# Patient Record
Sex: FEMALE | Race: WHITE | Employment: OTHER | URBAN - METROPOLITAN AREA
[De-identification: names, ages, dates, MRNs, and addresses within clinical notes are randomized per-mention and may not be internally consistent; named-entity substitution may affect disease eponyms.]

---

## 2017-05-10 PROBLEM — R09.82 PND (POST-NASAL DRIP): Status: ACTIVE | Noted: 2017-05-10

## 2017-05-10 PROBLEM — R05.9 COUGH: Status: ACTIVE | Noted: 2017-05-10

## 2017-08-24 ENCOUNTER — HOSPITAL ENCOUNTER (OUTPATIENT)
Dept: GENERAL RADIOLOGY | Age: 67
Discharge: HOME | End: 2017-08-24
Payer: MEDICARE

## 2017-08-24 DIAGNOSIS — R05.9 COUGH: ICD-10-CM

## 2017-08-24 PROCEDURE — 71020 XR CHEST PA LAT: CPT

## 2017-10-05 ENCOUNTER — HOSPITAL ENCOUNTER (OUTPATIENT)
Dept: LAB | Age: 67
Discharge: HOME | End: 2017-10-05

## 2017-10-05 PROCEDURE — 88305 TISSUE EXAM BY PATHOLOGIST: CPT | Performed by: INTERNAL MEDICINE

## 2017-11-16 ENCOUNTER — HOSPITAL ENCOUNTER (OUTPATIENT)
Dept: MAMMOGRAPHY | Age: 67
Discharge: HOME | End: 2017-11-16
Attending: INTERNAL MEDICINE
Payer: MEDICARE

## 2017-11-16 DIAGNOSIS — Z12.31 VISIT FOR SCREENING MAMMOGRAM: ICD-10-CM

## 2017-11-16 PROCEDURE — 77063 BREAST TOMOSYNTHESIS BI: CPT

## 2018-12-12 ENCOUNTER — HOSPITAL ENCOUNTER (OUTPATIENT)
Dept: MAMMOGRAPHY | Age: 68
Discharge: HOME | End: 2018-12-12
Attending: INTERNAL MEDICINE
Payer: MEDICARE

## 2018-12-12 DIAGNOSIS — Z12.31 VISIT FOR SCREENING MAMMOGRAM: ICD-10-CM

## 2018-12-12 PROCEDURE — 77063 BREAST TOMOSYNTHESIS BI: CPT

## 2020-05-20 ENCOUNTER — HOSPITAL ENCOUNTER (OUTPATIENT)
Dept: MAMMOGRAPHY | Age: 70
Discharge: HOME | End: 2020-05-20
Attending: INTERNAL MEDICINE
Payer: MEDICARE

## 2020-05-20 DIAGNOSIS — Z12.31 SCREENING MAMMOGRAM FOR HIGH-RISK PATIENT: ICD-10-CM

## 2020-05-20 PROCEDURE — 77063 BREAST TOMOSYNTHESIS BI: CPT

## 2021-04-28 ENCOUNTER — TRANSCRIBE ORDER (OUTPATIENT)
Dept: SCHEDULING | Age: 71
End: 2021-04-28

## 2021-04-28 DIAGNOSIS — Z12.31 SCREENING MAMMOGRAM FOR HIGH-RISK PATIENT: Primary | ICD-10-CM

## 2021-05-25 ENCOUNTER — HOSPITAL ENCOUNTER (OUTPATIENT)
Dept: MAMMOGRAPHY | Age: 71
Discharge: HOME | End: 2021-05-25
Attending: INTERNAL MEDICINE
Payer: MEDICARE

## 2021-05-25 DIAGNOSIS — Z12.31 SCREENING MAMMOGRAM FOR HIGH-RISK PATIENT: ICD-10-CM

## 2021-05-25 PROCEDURE — 77063 BREAST TOMOSYNTHESIS BI: CPT

## 2022-03-19 PROBLEM — R09.82 PND (POST-NASAL DRIP): Status: ACTIVE | Noted: 2017-05-10

## 2022-03-19 PROBLEM — R05.9 COUGH: Status: ACTIVE | Noted: 2017-05-10

## 2022-04-29 ENCOUNTER — TRANSCRIBE ORDER (OUTPATIENT)
Dept: SCHEDULING | Age: 72
End: 2022-04-29

## 2022-04-29 DIAGNOSIS — Z12.31 VISIT FOR SCREENING MAMMOGRAM: Primary | ICD-10-CM

## 2022-05-27 ENCOUNTER — HOSPITAL ENCOUNTER (OUTPATIENT)
Dept: MAMMOGRAPHY | Age: 72
Discharge: HOME | End: 2022-05-30
Payer: MEDICARE

## 2022-05-27 DIAGNOSIS — Z12.31 SCREENING MAMMOGRAM, ENCOUNTER FOR: ICD-10-CM

## 2022-05-27 PROCEDURE — 77063 BREAST TOMOSYNTHESIS BI: CPT

## 2023-03-31 ENCOUNTER — HOSPITAL ENCOUNTER (OUTPATIENT)
Dept: MRI IMAGING | Age: 73
End: 2023-03-31
Payer: MEDICARE

## 2023-03-31 DIAGNOSIS — R90.89 ABNORMAL BRAIN MRI: ICD-10-CM

## 2023-03-31 PROCEDURE — 2580000003 HC RX 258: Performed by: INTERNAL MEDICINE

## 2023-03-31 PROCEDURE — A9579 GAD-BASE MR CONTRAST NOS,1ML: HCPCS | Performed by: INTERNAL MEDICINE

## 2023-03-31 PROCEDURE — 6360000004 HC RX CONTRAST MEDICATION: Performed by: INTERNAL MEDICINE

## 2023-03-31 PROCEDURE — 70553 MRI BRAIN STEM W/O & W/DYE: CPT

## 2023-03-31 RX ORDER — SODIUM CHLORIDE 0.9 % (FLUSH) 0.9 %
10 SYRINGE (ML) INJECTION AS NEEDED
Status: DISCONTINUED | OUTPATIENT
Start: 2023-03-31 | End: 2023-04-04 | Stop reason: HOSPADM

## 2023-03-31 RX ADMIN — GADOTERIDOL 17 ML: 279.3 INJECTION, SOLUTION INTRAVENOUS at 07:29

## 2023-03-31 RX ADMIN — SODIUM CHLORIDE, PRESERVATIVE FREE 10 ML: 5 INJECTION INTRAVENOUS at 07:29

## 2023-04-28 ENCOUNTER — TRANSCRIBE ORDERS (OUTPATIENT)
Dept: SCHEDULING | Age: 73
End: 2023-04-28

## 2023-04-28 DIAGNOSIS — Z12.31 VISIT FOR SCREENING MAMMOGRAM: Primary | ICD-10-CM

## 2023-05-30 ENCOUNTER — HOSPITAL ENCOUNTER (OUTPATIENT)
Dept: MAMMOGRAPHY | Age: 73
Discharge: HOME | End: 2023-06-02
Payer: MEDICARE

## 2023-05-30 DIAGNOSIS — Z12.31 VISIT FOR SCREENING MAMMOGRAM: ICD-10-CM

## 2023-05-30 PROCEDURE — 77063 BREAST TOMOSYNTHESIS BI: CPT

## 2023-07-03 ENCOUNTER — OFFICE VISIT (OUTPATIENT)
Dept: SLEEP MEDICINE | Age: 73
End: 2023-07-03
Payer: MEDICARE

## 2023-07-03 VITALS
WEIGHT: 180 LBS | SYSTOLIC BLOOD PRESSURE: 138 MMHG | TEMPERATURE: 96.6 F | HEIGHT: 60 IN | BODY MASS INDEX: 35.34 KG/M2 | RESPIRATION RATE: 16 BRPM | DIASTOLIC BLOOD PRESSURE: 80 MMHG | HEART RATE: 71 BPM | OXYGEN SATURATION: 97 %

## 2023-07-03 DIAGNOSIS — G25.81 RLS (RESTLESS LEGS SYNDROME): ICD-10-CM

## 2023-07-03 DIAGNOSIS — G47.33 OSA (OBSTRUCTIVE SLEEP APNEA): Primary | ICD-10-CM

## 2023-07-03 DIAGNOSIS — G47.00 ORGANIC INSOMNIA: ICD-10-CM

## 2023-07-03 DIAGNOSIS — G47.61 PLMD (PERIODIC LIMB MOVEMENT DISORDER): ICD-10-CM

## 2023-07-03 DIAGNOSIS — R53.83 OTHER FATIGUE: ICD-10-CM

## 2023-07-03 PROCEDURE — G8427 DOCREV CUR MEDS BY ELIG CLIN: HCPCS | Performed by: INTERNAL MEDICINE

## 2023-07-03 PROCEDURE — G8400 PT W/DXA NO RESULTS DOC: HCPCS | Performed by: INTERNAL MEDICINE

## 2023-07-03 PROCEDURE — 1036F TOBACCO NON-USER: CPT | Performed by: INTERNAL MEDICINE

## 2023-07-03 PROCEDURE — 3017F COLORECTAL CA SCREEN DOC REV: CPT | Performed by: INTERNAL MEDICINE

## 2023-07-03 PROCEDURE — 1090F PRES/ABSN URINE INCON ASSESS: CPT | Performed by: INTERNAL MEDICINE

## 2023-07-03 PROCEDURE — 99204 OFFICE O/P NEW MOD 45 MIN: CPT | Performed by: INTERNAL MEDICINE

## 2023-07-03 PROCEDURE — G8417 CALC BMI ABV UP PARAM F/U: HCPCS | Performed by: INTERNAL MEDICINE

## 2023-07-03 PROCEDURE — 1123F ACP DISCUSS/DSCN MKR DOCD: CPT | Performed by: INTERNAL MEDICINE

## 2023-07-03 RX ORDER — AMOXICILLIN 250 MG
CAPSULE ORAL
COMMUNITY

## 2023-07-03 RX ORDER — AMLODIPINE BESYLATE 2.5 MG/1
TABLET ORAL
COMMUNITY
Start: 2023-06-30

## 2023-07-03 RX ORDER — COVID-19 ANTIGEN TEST
KIT MISCELLANEOUS
COMMUNITY

## 2023-07-03 RX ORDER — LOSARTAN POTASSIUM 100 MG/1
TABLET ORAL
COMMUNITY
Start: 2023-05-24

## 2023-07-03 RX ORDER — FAMOTIDINE 20 MG/1
20 TABLET, FILM COATED ORAL NIGHTLY
COMMUNITY
Start: 2023-06-01

## 2023-07-03 NOTE — PATIENT INSTRUCTIONS
It was a pleasure meeting you today. Here are some items that we discussed:    1. We will try a full face mask to treat your severe obstructive sleep apnea. We will ask [facility] to do a mask fitting. [facility]    Address: [address] [place] [address], [place], [address]  Phone: [phone]  Fax: [phone]    2. We ordered the following labs today: iron, vit d, ferritin, crp, magneisum, bmp  Please call us if you have any trouble getting them done or if you have not heard from us a week after the lab draw. 3.  If insomnia is of concern, keep a sleep log for my review and bring to followup appointment. Have at least 4 weeks worth of data. No Sanchez MD  [phone]     Sleep Apnea: Care Instructions  Overview     Sleep apnea means that you frequently stop breathing for 10 seconds or longer during sleep. It can be mild to severe, based on the number of times an hour that you stop breathing. Blocked or narrowed airways in your nose, mouth, or throat can cause sleep apnea. Your airway can become blocked when your throat muscles and tongue relax during sleep. You can help treat sleep apnea at home by making lifestyle changes. You also can use a CPAP breathing machine that keeps tissues in the throat from blocking your airway. Or your doctor may suggest that you use a breathing device while you sleep. It helps keep your airway open. This could be a device that you put in your mouth. In some cases, surgery may be needed to remove enlarged tissues in the throat. Follow-up care is a key part of your treatment and safety. Be sure to make and go to all appointments, and call your doctor if you are having problems. It's also a good idea to know your test results and keep a list of the medicines you take. How can you care for yourself at home? Lose weight, if needed. Sleep on your side. It may help mild apnea. Avoid alcohol and medicines such as sleeping pills, opioids, or sedatives before bed.   Don't

## 2023-07-03 NOTE — ASSESSMENT & PLAN NOTE
Patient has been maintained on trazodone for years, recently increased to 150 mg. She would like to be off of sleep medications eventually, encouraged her to complete a sleep log for our review at follow-up starting 4 weeks prior to the visit. I am hesitant to make any changes today while we are getting her really accustomed to CPAP.

## 2023-07-03 NOTE — ASSESSMENT & PLAN NOTE
Patient has mild (severe in supine sleep) sleep apnea and had done well with CPAP but has fallen out of the habit, she has never tried a fullface mask and would like to try this which we will order today through [facility], she thinks she would like an oronasal mask the best.  Her settings appear to be correct and I showed her how to operate her machine to adjust need to be and read her reports. We will see her back in 3 to 4 months, sooner if needed can occasionally update on HAILEY and CPAP.

## 2023-07-03 NOTE — ASSESSMENT & PLAN NOTE
Patient has clinical symptoms of restless leg syndrome, and this is very rarely affecting her  ability to sleep. We will obtain blood work today for review at next visit, iron panel, ferritin, vitamin D, magnesium, renal function (if not done recently). Restless leg syndrome symptomatology explained as well as educational handout given.

## 2023-08-28 ENCOUNTER — TELEPHONE (OUTPATIENT)
Dept: SLEEP MEDICINE | Age: 73
End: 2023-08-28

## 2023-08-28 NOTE — TELEPHONE ENCOUNTER
Patient says she has not heard anything from [facility] about the cpap order are setting up the mask fitting.  Ask that someone please check on this

## 2023-08-29 NOTE — TELEPHONE ENCOUNTER
Spoke with [employer]. Pt refused set up on machine she just wanted supplies. Bety Alfred said he will reach out to the patient to see what she wants to do.   Contact pt and lvm letting her know RM will be reaching out to her

## 2023-11-10 NOTE — PROGRESS NOTES
[address] [place]. [address] [facility], [address] [place] [address]  [phone]    PATIENT ID    Ms. Solomon Pal  1950  Her primary provider is Yesenia Steen PA-C    CC: Ms. Nadeem Carranza returns to the clinic today for follow up of her obstructive sleep apnea. INTERVAL HISTORY  Ms. Nadeem Carranza was originally diagnosed with mild obstructive sleep apnea in 2010 with an AHI of 9, severe by RDI criteria with an RDI of greater than 30, severe PLMI noted as well at that time . The patient has a medical history significant for BMI 35, hypertension/hyperlipidemia, type 2 diabetes mellitus, gastroesophageal reflux, anxiety/depression, arthritis, allergic rhinitis with deviated septum, history of TIA, chronic insomnia, restless leg syndrome. At last visit a new machine was ordered but she did not want to get a new machine and they called her because she thought it would be back in clinic he does like her old one but now realizes that the newer machines are smaller and would like them to go ahead and provide it to her. She did get a new mask the AirFit P30 I small and brings it today for fitting but unfortunately did not bring her machine or tubing. She notes she has started to wear this and is hearing a small whooshing sound and wondering if this is normal.  She denies any intolerance of pressure either too low or too high or any skin irritation. No interval change in health or medications otherwise. She continues on trazodone extended release 150 mg nightly, takes her Ativan mostly in the morning. Her insomnia is stable. We did not discuss restless legs today. She was last seen by me in 7/23 and was struggling with CPAP therapy, new machine ordered back then. Over the past week, since implementing CPAP therapy Ms. Nadeem Carranza feels more rested and less fatigued.   On CPAP download review today she is utilizing her CPAP machine 7% % of the time, greater than 4 hours per night, for a

## 2023-11-13 ENCOUNTER — TELEPHONE (OUTPATIENT)
Dept: SLEEP MEDICINE | Age: 73
End: 2023-11-13

## 2023-11-13 ENCOUNTER — OFFICE VISIT (OUTPATIENT)
Dept: SLEEP MEDICINE | Age: 73
End: 2023-11-13
Payer: MEDICARE

## 2023-11-13 VITALS
HEART RATE: 63 BPM | TEMPERATURE: 97.9 F | BODY MASS INDEX: 36.24 KG/M2 | SYSTOLIC BLOOD PRESSURE: 140 MMHG | WEIGHT: 184.6 LBS | RESPIRATION RATE: 16 BRPM | OXYGEN SATURATION: 95 % | DIASTOLIC BLOOD PRESSURE: 64 MMHG | HEIGHT: 60 IN

## 2023-11-13 DIAGNOSIS — G47.33 OSA (OBSTRUCTIVE SLEEP APNEA): Primary | ICD-10-CM

## 2023-11-13 DIAGNOSIS — G25.81 RLS (RESTLESS LEGS SYNDROME): ICD-10-CM

## 2023-11-13 DIAGNOSIS — G47.00 ORGANIC INSOMNIA: ICD-10-CM

## 2023-11-13 PROCEDURE — G8427 DOCREV CUR MEDS BY ELIG CLIN: HCPCS | Performed by: INTERNAL MEDICINE

## 2023-11-13 PROCEDURE — 1123F ACP DISCUSS/DSCN MKR DOCD: CPT | Performed by: INTERNAL MEDICINE

## 2023-11-13 PROCEDURE — G8417 CALC BMI ABV UP PARAM F/U: HCPCS | Performed by: INTERNAL MEDICINE

## 2023-11-13 PROCEDURE — 1090F PRES/ABSN URINE INCON ASSESS: CPT | Performed by: INTERNAL MEDICINE

## 2023-11-13 PROCEDURE — 1036F TOBACCO NON-USER: CPT | Performed by: INTERNAL MEDICINE

## 2023-11-13 PROCEDURE — G8400 PT W/DXA NO RESULTS DOC: HCPCS | Performed by: INTERNAL MEDICINE

## 2023-11-13 PROCEDURE — 3017F COLORECTAL CA SCREEN DOC REV: CPT | Performed by: INTERNAL MEDICINE

## 2023-11-13 PROCEDURE — 99214 OFFICE O/P EST MOD 30 MIN: CPT | Performed by: INTERNAL MEDICINE

## 2023-11-13 PROCEDURE — G8484 FLU IMMUNIZE NO ADMIN: HCPCS | Performed by: INTERNAL MEDICINE

## 2023-11-13 ASSESSMENT — SLEEP AND FATIGUE QUESTIONNAIRES
ESS TOTAL SCORE: 8
HOW LIKELY ARE YOU TO NOD OFF OR FALL ASLEEP WHILE LYING DOWN TO REST IN THE AFTERNOON WHEN CIRCUMSTANCES PERMIT: 3
HOW LIKELY ARE YOU TO NOD OFF OR FALL ASLEEP WHILE WATCHING TV: 1
HOW LIKELY ARE YOU TO NOD OFF OR FALL ASLEEP WHILE SITTING INACTIVE IN A PUBLIC PLACE: 0
HOW LIKELY ARE YOU TO NOD OFF OR FALL ASLEEP WHEN YOU ARE A PASSENGER IN A CAR FOR AN HOUR WITHOUT A BREAK: 2
HOW LIKELY ARE YOU TO NOD OFF OR FALL ASLEEP WHILE SITTING QUIETLY AFTER LUNCH WITHOUT ALCOHOL: 1
HOW LIKELY ARE YOU TO NOD OFF OR FALL ASLEEP WHILE SITTING AND TALKING TO SOMEONE: 0
HOW LIKELY ARE YOU TO NOD OFF OR FALL ASLEEP IN A CAR, WHILE STOPPED FOR A FEW MINUTES IN TRAFFIC: 0
HOW LIKELY ARE YOU TO NOD OFF OR FALL ASLEEP WHILE SITTING AND READING: 1

## 2023-11-13 NOTE — TELEPHONE ENCOUNTER
Pt will need new sleep study in order to get a new cpap machine. After sleep study is read, her sleep study will need to go to  and order for a new machine.  Spoke with pt and she is agreed to hst.

## 2023-11-13 NOTE — ASSESSMENT & PLAN NOTE
The AirFit P30 I small, did fitting in clinic today and this does not appear to be a good fit unfortunately she did not bring her machine so I could not apply pressure. He initially had mild obstructive sleep apnea in 2010 I suspect progression of her disease we discussed reevaluating her at some point but she would like to try her new mask and go ahead and proceed with getting a new machine as ordered last visit. Supply order sent to [facility] we will see her back in 3 months for face-to-face compliance visit.

## 2023-11-13 NOTE — ASSESSMENT & PLAN NOTE
This is stable on trazodone, we agreed that someday we should try to pull back but I like her to be established on CPAP therapy before we do this. Patient is agreeable.   She certainly

## 2023-11-13 NOTE — PATIENT INSTRUCTIONS
vinegar)     Some companies may tell you to add a little bit of bleach. However I find that bleach to be too toxic and difficult to rinse adequately. Supply Replacement Schedule (what insurance will cover)    You may not need to replace all parts this frequently if you are doing proper cleaning. Filters: 2 per month  Nasal Cushion/Pillow: 2 per month  Full Face cushion: 1 per month  Tubin every 3 months  Full Mask: 1 every 6 months  Headgear: 1 every 6 months  Water Chamber: 1 every 6 months  Chinstrap: 1 every 6 months      Many patients struggle to find comfortable heat and humidity settings on their device. If you are having issues with condensation in your mask or tubing, it usually means your temperature is too low, and/or your humidity is too high. If you are experiencing nasal congestion, it usually means you would benefit from higher humidity setting. Most people on nasal pillows are more comfortable if the heat and humidity are relatively high, such as 84-86 degrees, and 5-6 humidity. You can adjust these settings yourself to find what is comfortable for you. You can always call us or your equipment company for help, as well. Contact your equipment vendor for replacement supplies whenever in need.

## 2023-12-06 ENCOUNTER — HOSPITAL ENCOUNTER (OUTPATIENT)
Dept: SLEEP CENTER | Age: 73
Discharge: HOME | End: 2023-12-09

## 2023-12-16 ENCOUNTER — HOSPITAL ENCOUNTER (OUTPATIENT)
Dept: MRI IMAGING | Age: 73
End: 2023-12-16
Payer: MEDICARE

## 2023-12-16 DIAGNOSIS — R90.89 ABNORMAL BRAIN MRI: ICD-10-CM

## 2023-12-16 PROCEDURE — 6360000004 HC RX CONTRAST MEDICATION: Performed by: PHYSICIAN ASSISTANT

## 2023-12-16 PROCEDURE — 70553 MRI BRAIN STEM W/O & W/DYE: CPT

## 2023-12-16 PROCEDURE — A9579 GAD-BASE MR CONTRAST NOS,1ML: HCPCS | Performed by: PHYSICIAN ASSISTANT

## 2023-12-16 PROCEDURE — 2580000003 HC RX 258: Performed by: PHYSICIAN ASSISTANT

## 2023-12-16 RX ORDER — SODIUM CHLORIDE 0.9 % (FLUSH) 0.9 %
10 SYRINGE (ML) INJECTION AS NEEDED
Status: ACTIVE | OUTPATIENT
Start: 2023-12-16

## 2023-12-16 RX ADMIN — SODIUM CHLORIDE, PRESERVATIVE FREE 10 ML: 5 INJECTION INTRAVENOUS at 09:18

## 2023-12-16 RX ADMIN — GADOTERIDOL 17 ML: 279.3 INJECTION, SOLUTION INTRAVENOUS at 09:18

## 2024-05-02 NOTE — PROGRESS NOTES
Clavulanate      Other reaction(s): Unknown    Penicillins Rash    Sulfa Antibiotics Rash       OBJECTIVE   /83   Pulse 83   Temp 98.1 °F (36.7 °C)   Resp 16   Ht 1.524 m (5')   Wt 82.6 kg (182 lb 3.2 oz)   SpO2 95%   BMI 35.58 kg/m²         Exam:  Constitutional: Pleasant 73 y.o. female, appears well-developed and well-nourished. Not in acute distress.     Eyes: Conjunctivae and lids appear normal. No pallor or icterus   Skin: Not diaphoretic. No lesions  HENT: Head normocephalic. Nares appear patent   Pulmonary: Respiratory effort is normal without labored breathing or accessory muscle use. No respiratory distress/cough/wheezng  Neurological: alert , Attention, speech and language are normal. There is facial symmetry.   Musculoskeletal:  Moves all extremities equally and no focal weakness noted. Gait is unassisted.  Psychiatric: pleasant, mood and affect well adjusted.   cooperative and behavior is appropriate.    Results:                         ASSESSMENT AND PLAN  1. Organic insomnia  Assessment & Plan:   Stable on trazodone, she notes when she does not wear CPAP she wakes up in the middle the night more frequently and I take explained to her this is expected when she is not breathing well, when she wears her CPAP she is able to sleep through the entire night  2. HAILEY (obstructive sleep apnea)  Assessment & Plan:  Patient is still struggling with using CPAP, it seems to be mostly like a habit.  She is concerned about taking it on travel trips.  She has underlying mild sleep apnea and may be a good candidate for oral appliance, as her last HST failed we will reorder this today and send her to Dr. Thomas's office at [facility].  For now we agreed to continue CPAP and we showed her how to take apart her mask to clean it and as she is having some nasal soreness with nasal pillows we will try nasal cushions, we will plan to tentatively follow-up in 4 months.  We will hold off on

## 2024-05-03 ENCOUNTER — OFFICE VISIT (OUTPATIENT)
Dept: SLEEP MEDICINE | Age: 74
End: 2024-05-03
Payer: MEDICARE

## 2024-05-03 ENCOUNTER — CLINICAL DOCUMENTATION (OUTPATIENT)
Dept: SLEEP MEDICINE | Age: 74
End: 2024-05-03

## 2024-05-03 VITALS
SYSTOLIC BLOOD PRESSURE: 132 MMHG | BODY MASS INDEX: 35.77 KG/M2 | RESPIRATION RATE: 16 BRPM | HEART RATE: 83 BPM | OXYGEN SATURATION: 95 % | TEMPERATURE: 98.1 F | WEIGHT: 182.2 LBS | DIASTOLIC BLOOD PRESSURE: 83 MMHG | HEIGHT: 60 IN

## 2024-05-03 DIAGNOSIS — G47.00 ORGANIC INSOMNIA: Primary | ICD-10-CM

## 2024-05-03 DIAGNOSIS — G25.81 RLS (RESTLESS LEGS SYNDROME): ICD-10-CM

## 2024-05-03 DIAGNOSIS — G47.33 OSA (OBSTRUCTIVE SLEEP APNEA): ICD-10-CM

## 2024-05-03 PROCEDURE — G8427 DOCREV CUR MEDS BY ELIG CLIN: HCPCS | Performed by: INTERNAL MEDICINE

## 2024-05-03 PROCEDURE — 99214 OFFICE O/P EST MOD 30 MIN: CPT | Performed by: INTERNAL MEDICINE

## 2024-05-03 PROCEDURE — G8400 PT W/DXA NO RESULTS DOC: HCPCS | Performed by: INTERNAL MEDICINE

## 2024-05-03 PROCEDURE — 1090F PRES/ABSN URINE INCON ASSESS: CPT | Performed by: INTERNAL MEDICINE

## 2024-05-03 PROCEDURE — 1036F TOBACCO NON-USER: CPT | Performed by: INTERNAL MEDICINE

## 2024-05-03 PROCEDURE — 3017F COLORECTAL CA SCREEN DOC REV: CPT | Performed by: INTERNAL MEDICINE

## 2024-05-03 PROCEDURE — G8417 CALC BMI ABV UP PARAM F/U: HCPCS | Performed by: INTERNAL MEDICINE

## 2024-05-03 PROCEDURE — 1123F ACP DISCUSS/DSCN MKR DOCD: CPT | Performed by: INTERNAL MEDICINE

## 2024-05-03 RX ORDER — FERROUS SULFATE 325(65) MG
1 TABLET ORAL EVERY OTHER DAY
COMMUNITY
Start: 2024-04-16

## 2024-05-03 RX ORDER — SEMAGLUTIDE 0.68 MG/ML
INJECTION, SOLUTION SUBCUTANEOUS WEEKLY
COMMUNITY
Start: 2024-04-16

## 2024-05-03 ASSESSMENT — SLEEP AND FATIGUE QUESTIONNAIRES
HOW LIKELY ARE YOU TO NOD OFF OR FALL ASLEEP WHILE SITTING INACTIVE IN A PUBLIC PLACE: WOULD NEVER DOZE
ESS TOTAL SCORE: 4
HOW LIKELY ARE YOU TO NOD OFF OR FALL ASLEEP WHILE SITTING AND TALKING TO SOMEONE: WOULD NEVER DOZE
HOW LIKELY ARE YOU TO NOD OFF OR FALL ASLEEP WHILE SITTING AND READING: WOULD NEVER DOZE
HOW LIKELY ARE YOU TO NOD OFF OR FALL ASLEEP WHILE SITTING QUIETLY AFTER LUNCH WITHOUT ALCOHOL: WOULD NEVER DOZE
HOW LIKELY ARE YOU TO NOD OFF OR FALL ASLEEP IN A CAR, WHILE STOPPED FOR A FEW MINUTES IN TRAFFIC: WOULD NEVER DOZE
HOW LIKELY ARE YOU TO NOD OFF OR FALL ASLEEP WHILE LYING DOWN TO REST IN THE AFTERNOON WHEN CIRCUMSTANCES PERMIT: MODERATE CHANCE OF DOZING
HOW LIKELY ARE YOU TO NOD OFF OR FALL ASLEEP WHILE WATCHING TV: SLIGHT CHANCE OF DOZING
HOW LIKELY ARE YOU TO NOD OFF OR FALL ASLEEP WHEN YOU ARE A PASSENGER IN A CAR FOR AN HOUR WITHOUT A BREAK: SLIGHT CHANCE OF DOZING

## 2024-05-03 NOTE — PATIENT INSTRUCTIONS
It was a pleasure meeting you today.  Here are some items that we discussed:    1.   We will try nasal cushions instead of nasal pillows to see if that is more comfortable.  I sent orders to [facility] .    2.   Be sure to wear your cpap every night.    3.   We order a home sleep test.    Here is a list of [employer] Providers in our area:    Dr. John Thomas of [facility]        Address: [address]                         [place] [address]        Telephone: [phone]         [email or web address]     2.  Dr. Andrei Hummel of [facility]      Address: [address] [place] [address]     Telephone: [phone]       [email or web address]           Pedro Olivia MD  [phone]     Please continue to use your CPAPYou are getting a good response with it.    To get the best benefits from CPAP therapy you will need to use your device for all periods of sleep, this includes naps.   Please do not drive if you are sleepy.              I will see you back in 4-6 months.    See your Durable Medical Equipment (DME) provider:  For any mask fit issues and equipment replacement  Use it every time you go to sleep, day or night.   Replace your mask cushion, headgear, and filters regularly.  Clean your mask daily and the rest every one to two weeks.    If any other issues or questions about your use of CPAP, mask, or pressure, arise, please call your medical equipment company.   If you are still having issues, please  use [employer] or call to contact our sleep clinic so that we can assist you.     If you have issues that are not about your CPAP (example: medication refills or side effects), please call the  at [phone].  Please work on maintaining a healthy weight.  A BMI <30 is considered a healthy weight. Current Body mass index is 35.58 kg/m².. If you are overweight, a loss of 2

## 2024-05-03 NOTE — ASSESSMENT & PLAN NOTE
Patient is still struggling with using CPAP, it seems to be mostly like a habit.  She is concerned about taking it on travel trips.  She has underlying mild sleep apnea and may be a good candidate for oral appliance, as her last HST failed we will reorder this today and send her to Dr. Thomas's office at [facility].  For now we agreed to continue CPAP and we showed her how to take apart her mask to clean it and as she is having some nasal soreness with nasal pillows we will try nasal cushions, we will plan to tentatively follow-up in 4 months.  We will hold off on ordering a new machine at this time until we get a clear direction of which therapy she will prefer.

## 2024-05-03 NOTE — PROGRESS NOTES
Pt was referred for oral appliance and sleep study. After sleep study is complete. Sent referral and sleep study to Dr. RILEY PAGAN

## 2024-05-03 NOTE — ASSESSMENT & PLAN NOTE
Stable on trazodone, she notes when she does not wear CPAP she wakes up in the middle the night more frequently and I take explained to her this is expected when she is not breathing well, when she wears her CPAP she is able to sleep through the entire night

## 2024-05-28 ENCOUNTER — TRANSCRIBE ORDERS (OUTPATIENT)
Dept: SCHEDULING | Age: 74
End: 2024-05-28

## 2024-05-28 DIAGNOSIS — Z12.31 SCREENING MAMMOGRAM FOR HIGH-RISK PATIENT: Primary | ICD-10-CM

## 2024-06-17 ENCOUNTER — HOSPITAL ENCOUNTER (OUTPATIENT)
Dept: SLEEP CENTER | Age: 74
Discharge: HOME | End: 2024-06-20
Payer: MEDICARE

## 2024-06-17 DIAGNOSIS — R53.83 OTHER FATIGUE: ICD-10-CM

## 2024-06-17 DIAGNOSIS — G47.61 PLMD (PERIODIC LIMB MOVEMENT DISORDER): ICD-10-CM

## 2024-06-17 LAB
25(OH)D3 SERPL-MCNC: 42.1 NG/ML (ref 30–100)
FERRITIN SERPL-MCNC: 24 NG/ML (ref 8–388)
IRON SERPL-MCNC: 59 UG/DL (ref 35–100)
MAGNESIUM SERPL-MCNC: 1.6 MG/DL (ref 1.8–2.4)
TIBC SERPL-MCNC: 360 UG/DL (ref 240–450)

## 2024-06-17 PROCEDURE — 95806 SLEEP STUDY UNATT&RESP EFFT: CPT

## 2024-06-18 LAB — CRP SERPL-MCNC: 1 MG/L (ref 0–10)

## 2024-07-01 ENCOUNTER — HOSPITAL ENCOUNTER (OUTPATIENT)
Dept: MAMMOGRAPHY | Age: 74
Discharge: HOME | End: 2024-07-04
Payer: MEDICARE

## 2024-07-01 DIAGNOSIS — Z12.31 SCREENING MAMMOGRAM FOR HIGH-RISK PATIENT: ICD-10-CM

## 2024-07-01 PROCEDURE — 77063 BREAST TOMOSYNTHESIS BI: CPT

## 2024-07-22 ENCOUNTER — TELEPHONE (OUTPATIENT)
Dept: SLEEP MEDICINE | Age: 74
End: 2024-07-22

## 2024-07-22 NOTE — TELEPHONE ENCOUNTER
ROS labs drawn showed low magnesium and low ferritin, she is on low-dose iron already, vitamin D was normal.  No changes to therapy as she was asymptomatic at last visit but should she develop symptoms we will likely increase iron and add magnesium as a first-line therapy.  Sent her an electronic message on [employer] explaining this.

## 2024-07-25 ENCOUNTER — TELEPHONE (OUTPATIENT)
Dept: SLEEP MEDICINE | Age: 74
End: 2024-07-25

## 2024-07-25 NOTE — TELEPHONE ENCOUNTER
Spoke with patient regarding her hst. Pt decided to just stay on her cpap machine. She doesn't want to pursue the oral appliance at this time. Pt is schedule to see dr. Barnes on 11/6/24. ---- Message from Rossana Olivia III, MD sent at 7/22/2024  3:24 PM EDT -----  Please let patient know her sleep study did show persistent mild obstructive sleep apnea, she can discuss this with us at follow-up as well as with her sleep dentist.   her sleep dentist, can you please verify she has a referral in place with plans to make the appliance?

## 2024-11-25 ENCOUNTER — OFFICE VISIT (OUTPATIENT)
Dept: SLEEP MEDICINE | Age: 74
End: 2024-11-25
Payer: MEDICARE

## 2024-11-25 VITALS
HEIGHT: 60 IN | DIASTOLIC BLOOD PRESSURE: 84 MMHG | WEIGHT: 176 LBS | HEART RATE: 75 BPM | BODY MASS INDEX: 34.55 KG/M2 | OXYGEN SATURATION: 96 % | SYSTOLIC BLOOD PRESSURE: 129 MMHG | RESPIRATION RATE: 17 BRPM

## 2024-11-25 DIAGNOSIS — G47.00 ORGANIC INSOMNIA: ICD-10-CM

## 2024-11-25 DIAGNOSIS — G25.81 RLS (RESTLESS LEGS SYNDROME): ICD-10-CM

## 2024-11-25 DIAGNOSIS — E66.9 OBESITY (BMI 35.0-39.9 WITHOUT COMORBIDITY): ICD-10-CM

## 2024-11-25 DIAGNOSIS — G47.33 OSA (OBSTRUCTIVE SLEEP APNEA): Primary | ICD-10-CM

## 2024-11-25 PROCEDURE — 99213 OFFICE O/P EST LOW 20 MIN: CPT | Performed by: NURSE PRACTITIONER

## 2024-11-25 PROCEDURE — G2211 COMPLEX E/M VISIT ADD ON: HCPCS | Performed by: NURSE PRACTITIONER

## 2024-11-25 PROCEDURE — 3017F COLORECTAL CA SCREEN DOC REV: CPT | Performed by: NURSE PRACTITIONER

## 2024-11-25 PROCEDURE — 1036F TOBACCO NON-USER: CPT | Performed by: NURSE PRACTITIONER

## 2024-11-25 PROCEDURE — G8427 DOCREV CUR MEDS BY ELIG CLIN: HCPCS | Performed by: NURSE PRACTITIONER

## 2024-11-25 PROCEDURE — G8400 PT W/DXA NO RESULTS DOC: HCPCS | Performed by: NURSE PRACTITIONER

## 2024-11-25 PROCEDURE — G8417 CALC BMI ABV UP PARAM F/U: HCPCS | Performed by: NURSE PRACTITIONER

## 2024-11-25 PROCEDURE — 1159F MED LIST DOCD IN RCRD: CPT | Performed by: NURSE PRACTITIONER

## 2024-11-25 PROCEDURE — G8484 FLU IMMUNIZE NO ADMIN: HCPCS | Performed by: NURSE PRACTITIONER

## 2024-11-25 PROCEDURE — 1090F PRES/ABSN URINE INCON ASSESS: CPT | Performed by: NURSE PRACTITIONER

## 2024-11-25 PROCEDURE — 1123F ACP DISCUSS/DSCN MKR DOCD: CPT | Performed by: NURSE PRACTITIONER

## 2024-11-25 ASSESSMENT — SLEEP AND FATIGUE QUESTIONNAIRES
HOW LIKELY ARE YOU TO NOD OFF OR FALL ASLEEP WHILE LYING DOWN TO REST IN THE AFTERNOON WHEN CIRCUMSTANCES PERMIT: SLIGHT CHANCE OF DOZING
ESS TOTAL SCORE: 3
HOW LIKELY ARE YOU TO NOD OFF OR FALL ASLEEP IN A CAR, WHILE STOPPED FOR A FEW MINUTES IN TRAFFIC: WOULD NEVER DOZE
HOW LIKELY ARE YOU TO NOD OFF OR FALL ASLEEP WHILE SITTING QUIETLY AFTER LUNCH WITHOUT ALCOHOL: WOULD NEVER DOZE
HOW LIKELY ARE YOU TO NOD OFF OR FALL ASLEEP WHILE WATCHING TV: WOULD NEVER DOZE
HOW LIKELY ARE YOU TO NOD OFF OR FALL ASLEEP WHILE SITTING AND TALKING TO SOMEONE: WOULD NEVER DOZE
HOW LIKELY ARE YOU TO NOD OFF OR FALL ASLEEP WHILE SITTING INACTIVE IN A PUBLIC PLACE: WOULD NEVER DOZE
HOW LIKELY ARE YOU TO NOD OFF OR FALL ASLEEP WHILE SITTING AND READING: SLIGHT CHANCE OF DOZING
HOW LIKELY ARE YOU TO NOD OFF OR FALL ASLEEP WHEN YOU ARE A PASSENGER IN A CAR FOR AN HOUR WITHOUT A BREAK: SLIGHT CHANCE OF DOZING

## 2024-11-25 NOTE — PROGRESS NOTES
palpitations, orthopnea, murmurs, or edema.   GI:   No dysphagia, heartburn reflux, nausea/vomiting, diarrhea, abdominal pain, or bleeding.   NEURO:   There is no history of AMS, persistent headache, decreased level of consciousness, seizures, or motor or sensory deficits.      PHYSICAL EXAM:    Vitals:    11/25/24 1002   BP: 129/84   Pulse: 75   Resp: 17   SpO2: 96%   Weight: 79.8 kg (176 lb)   Height: 1.524 m (5')        Body mass index is 34.37 kg/m².         GENERAL APPEARANCE:   The patient is obese and in no respiratory distress.   HEENT:   PERRL.  Conjunctivae unremarkable.   Nasal mucosa is without epistaxis, exudate, or polyps. Nares patent bilateral.  Gums and dentition are unremarkable.  There is oropharyngeal narrowing.  Ibarra score 3.      NECK/LYMPHATIC:   Symmetrical with no elevation of jugular venous pulsation.  Trachea midline. No thyroid enlargement.  No cervical adenopathy.   LUNGS:   Normal respiratory effort with symmetrical lung expansion.   Breath sounds clear.   HEART:   There is a regular rate and rhythm.  No murmur, rub, or gallop.  There is no edema in the lower extremities.   ABDOMEN:   Soft and non-tender.  No hepatosplenomegaly.  Bowel sounds are normal.     NEURO:   The patient is alert and oriented to person, place, and time.  Memory appears intact and mood is normal.  No gross sensorimotor deficits are present.          ASSESSMENT:  (Medical Decision Making)       ICD-10-CM    1. HAILEY (obstructive sleep apnea)  G47.33 DME - DURABLE MEDICAL EQUIPMENT - Patient is using, compliant and benefiting from Pap therapy. Continue current settings as AHI is down to 1.5 events per hour.  Patient encouraged to use CPAP more frequently      2. RLS (restless legs syndrome)  G25.81 Denies any symptoms of restless leg syndrome.  Continue daily oral iron supplementation      3. Organic insomnia  G47.00 Continue trazodone as prescribed by primary care      4. Obesity (BMI 35.0-39.9 without

## 2024-11-25 NOTE — PATIENT INSTRUCTIONS
Continue CPAP 7-15 cm H2O with nightly compliance  New CPAP supplies ordered  Recommendations as above  Follow-up in 6 months or sooner if needed

## 2025-02-26 ENCOUNTER — HOSPITAL ENCOUNTER (OUTPATIENT)
Dept: MRI IMAGING | Age: 75
Discharge: HOME | End: 2025-03-01
Payer: MEDICARE

## 2025-02-26 DIAGNOSIS — D32.9 MENINGIOMA (HCC): ICD-10-CM

## 2025-02-26 PROCEDURE — 6360000004 HC RX CONTRAST MEDICATION: Performed by: PHYSICIAN ASSISTANT

## 2025-02-26 PROCEDURE — 70553 MRI BRAIN STEM W/O & W/DYE: CPT

## 2025-02-26 PROCEDURE — A9579 GAD-BASE MR CONTRAST NOS,1ML: HCPCS | Performed by: PHYSICIAN ASSISTANT

## 2025-02-26 RX ADMIN — GADOTERIDOL 16 ML: 279.3 INJECTION, SOLUTION INTRAVENOUS at 07:01

## 2025-04-01 ENCOUNTER — HOSPITAL ENCOUNTER (OUTPATIENT)
Dept: MAMMOGRAPHY | Age: 75
Discharge: HOME | End: 2025-04-04
Payer: MEDICARE

## 2025-04-01 DIAGNOSIS — Z78.0 MENOPAUSE: ICD-10-CM

## 2025-04-01 PROCEDURE — 77080 DXA BONE DENSITY AXIAL: CPT

## 2025-05-31 ASSESSMENT — SLEEP AND FATIGUE QUESTIONNAIRES
HOW LIKELY ARE YOU TO NOD OFF OR FALL ASLEEP WHEN YOU ARE A PASSENGER IN A CAR FOR AN HOUR WITHOUT A BREAK: SLIGHT CHANCE OF DOZING
ESS TOTAL SCORE: 4
HOW LIKELY ARE YOU TO NOD OFF OR FALL ASLEEP WHILE SITTING AND READING: SLIGHT CHANCE OF DOZING
HOW LIKELY ARE YOU TO NOD OFF OR FALL ASLEEP WHILE SITTING AND TALKING TO SOMEONE: WOULD NEVER DOZE
HOW LIKELY ARE YOU TO NOD OFF OR FALL ASLEEP WHILE SITTING INACTIVE IN A PUBLIC PLACE: WOULD NEVER DOZE
HOW LIKELY ARE YOU TO NOD OFF OR FALL ASLEEP IN A CAR, WHILE STOPPED FOR A FEW MINUTES IN TRAFFIC: WOULD NEVER DOZE
HOW LIKELY ARE YOU TO NOD OFF OR FALL ASLEEP WHILE SITTING QUIETLY AFTER LUNCH WITHOUT ALCOHOL: WOULD NEVER DOZE
HOW LIKELY ARE YOU TO NOD OFF OR FALL ASLEEP WHILE WATCHING TV: SLIGHT CHANCE OF DOZING
HOW LIKELY ARE YOU TO NOD OFF OR FALL ASLEEP WHILE SITTING AND READING: SLIGHT CHANCE OF DOZING
HOW LIKELY ARE YOU TO NOD OFF OR FALL ASLEEP WHILE LYING DOWN TO REST IN THE AFTERNOON WHEN CIRCUMSTANCES PERMIT: SLIGHT CHANCE OF DOZING
HOW LIKELY ARE YOU TO NOD OFF OR FALL ASLEEP WHILE SITTING AND TALKING TO SOMEONE: WOULD NEVER DOZE
HOW LIKELY ARE YOU TO NOD OFF OR FALL ASLEEP WHILE WATCHING TV: SLIGHT CHANCE OF DOZING
HOW LIKELY ARE YOU TO NOD OFF OR FALL ASLEEP IN A CAR, WHILE STOPPED FOR A FEW MINUTES IN TRAFFIC: WOULD NEVER DOZE
HOW LIKELY ARE YOU TO NOD OFF OR FALL ASLEEP WHILE SITTING INACTIVE IN A PUBLIC PLACE: WOULD NEVER DOZE
HOW LIKELY ARE YOU TO NOD OFF OR FALL ASLEEP WHILE SITTING QUIETLY AFTER LUNCH WITHOUT ALCOHOL: WOULD NEVER DOZE
HOW LIKELY ARE YOU TO NOD OFF OR FALL ASLEEP WHEN YOU ARE A PASSENGER IN A CAR FOR AN HOUR WITHOUT A BREAK: SLIGHT CHANCE OF DOZING
HOW LIKELY ARE YOU TO NOD OFF OR FALL ASLEEP WHILE LYING DOWN TO REST IN THE AFTERNOON WHEN CIRCUMSTANCES PERMIT: SLIGHT CHANCE OF DOZING

## 2025-06-02 ENCOUNTER — OFFICE VISIT (OUTPATIENT)
Dept: SLEEP MEDICINE | Age: 75
End: 2025-06-02

## 2025-06-02 VITALS
BODY MASS INDEX: 34.59 KG/M2 | HEIGHT: 60 IN | WEIGHT: 176.2 LBS | SYSTOLIC BLOOD PRESSURE: 126 MMHG | OXYGEN SATURATION: 92 % | TEMPERATURE: 98.4 F | DIASTOLIC BLOOD PRESSURE: 86 MMHG | RESPIRATION RATE: 18 BRPM | HEART RATE: 72 BPM

## 2025-06-02 DIAGNOSIS — G47.00 ORGANIC INSOMNIA: ICD-10-CM

## 2025-06-02 DIAGNOSIS — G25.81 RLS (RESTLESS LEGS SYNDROME): ICD-10-CM

## 2025-06-02 DIAGNOSIS — G47.33 OSA (OBSTRUCTIVE SLEEP APNEA): Primary | ICD-10-CM

## 2025-06-02 DIAGNOSIS — E66.9 OBESITY (BMI 35.0-39.9 WITHOUT COMORBIDITY): ICD-10-CM

## 2025-06-02 DIAGNOSIS — R09.02 HYPOXEMIA: ICD-10-CM

## 2025-06-02 ASSESSMENT — ENCOUNTER SYMPTOMS
SORE THROAT: 0
APNEA: 0
VOICE CHANGE: 0
SHORTNESS OF BREATH: 0

## 2025-06-02 NOTE — PATIENT INSTRUCTIONS
Please use CPAP nightly for full benefit of therapy for overall health  New order sent to [facility] for N30i mask  Pleas ensure you are getting at least 7 hours of sleep while using PAP therapy nightly  Overnight oxygen test ordered through [employer]. Please  through them and use for one night while using PAP.  Follow up in 6 months or sooner as needed    Overnight Oximetry is a device with a watch and a finger probe that you wear when you sleep. It measures your heart rate and the amount of oxygen in your blood. This test records the data, return the device back to your medical equipment company, and the data is sent to our office for review. You will be called with the results.              Learning About Sleeping Well  What does sleeping well mean?    Sleeping well means getting enough sleep. How much sleep is enough varies among people.  The number of hours you sleep is not as important as how you feel when you wake up. If you do not feel refreshed, you probably need more sleep. Another sign of not getting enough sleep is feeling tired during the day.  The average total nightly sleep time is 7½ to 8 hours. Healthy adults may need a little more or a little less than this.  Why is getting enough sleep important?  Getting enough quality sleep is a basic part of good health. When your sleep suffers, your mood and your thoughts can suffer too. You may find yourself feeling more grumpy or stressed. Not getting enough sleep also can lead to serious problems, including injury, accidents, anxiety, and depression.  What might cause poor sleeping?  Many things can cause sleep problems, including:  Stress. Stress can be caused by fear about a single event, such as giving a speech. Or you may have ongoing stress, such as worry about work or school.  Depression, anxiety, and other mental or emotional conditions.  Changes in your sleep habits or surroundings. This includes changes that happen where you sleep,

## 2025-06-02 NOTE — PROGRESS NOTES
[facility]  [address]  [place] [address]  [phone]    Patient Name:  Ambreen Cabezas  YOB: 1950      Office Visit 6/2/2025    CHIEF COMPLAINT:    Chief Complaint   Patient presents with    Sleep Apnea         HISTORY OF PRESENT ILLNESS:  Patient is a 74 y.o. female seen today for follow up of HAILEY and RLS.  Most recent HST on  6/17/2024 with AHI 5.3 and O2 vivian of 85%.  Patient is prescribed cpap therapy with a humidifier set at 7-15 cm with a nasal (N30i, size small) mask. The usage days greater than four hours is 34/186 (18%).  The hourly usage is 5 hours 30 minutes nightly.  Most recent download reveals AHI on PAP therapy is 2.3, leak is 38.4 L/min at the 95th percentile. The patient is compliant with the PAP therapy and is feeling better as a result.     PAP therapy is sporadic.  She reports that she knows she needs to use it more often as she feels better and has increased energy when she uses the PAP.  She does experience some leak issues, so we will reduce the max pressure from 15 cm to 14 cm H2O today.  The machine is working well.  Patient continues to take Ativan and duloxetine in the morning and trazodone in the p.m. for sleep.  Patient sees psychiatrist Alonzo Chow, who prescribes these medications.  Patient also states that restless leg symptoms are well-controlled.  She had 2 iron infusions in December and now reports that her iron is elevated.  Her PCP has been monitoring and she is no longer having symptoms of restless leg.  Patient denies swelling in her legs, but reports nocturia 1-2 times per night.  Her blood pressure is well-controlled on medications.  She does take a long-acting diuretic, chlorthalidone, in the mornings.  Her weight has been stable, although she is disappointed as she has not lost as much weight as she was hoping while on Ozempic.  The Ozempic has mainly helped her blood sugar levels.  Patient is agreeable to a

## 2025-07-28 ENCOUNTER — HOSPITAL ENCOUNTER (OUTPATIENT)
Dept: MAMMOGRAPHY | Age: 75
Discharge: HOME | End: 2025-07-31
Payer: MEDICARE

## 2025-07-28 DIAGNOSIS — Z12.31 ENCOUNTER FOR SCREENING MAMMOGRAM FOR HIGH-RISK PATIENT: ICD-10-CM

## 2025-07-28 PROCEDURE — 77063 BREAST TOMOSYNTHESIS BI: CPT
